# Patient Record
Sex: MALE | Race: WHITE | URBAN - METROPOLITAN AREA
[De-identification: names, ages, dates, MRNs, and addresses within clinical notes are randomized per-mention and may not be internally consistent; named-entity substitution may affect disease eponyms.]

---

## 2021-01-05 ENCOUNTER — OFFICE VISIT (OUTPATIENT)
Dept: FAMILY MEDICINE CLINIC | Facility: CLINIC | Age: 41
End: 2021-01-05
Payer: COMMERCIAL

## 2021-01-05 VITALS
OXYGEN SATURATION: 98 % | SYSTOLIC BLOOD PRESSURE: 116 MMHG | DIASTOLIC BLOOD PRESSURE: 69 MMHG | TEMPERATURE: 97 F | RESPIRATION RATE: 18 BRPM | HEART RATE: 60 BPM

## 2021-01-05 DIAGNOSIS — Z20.822 EXPOSURE TO COVID-19 VIRUS: ICD-10-CM

## 2021-01-05 DIAGNOSIS — R05.9 COUGH: Primary | ICD-10-CM

## 2021-01-05 PROCEDURE — 3078F DIAST BP <80 MM HG: CPT | Performed by: NURSE PRACTITIONER

## 2021-01-05 PROCEDURE — 3074F SYST BP LT 130 MM HG: CPT | Performed by: NURSE PRACTITIONER

## 2021-01-05 PROCEDURE — 99202 OFFICE O/P NEW SF 15 MIN: CPT | Performed by: NURSE PRACTITIONER

## 2021-01-05 NOTE — PATIENT INSTRUCTIONS
• Covid hotline number is [phone]  • Results for covid testing can vary from 3-5 days   • Notify your employer or school of testing  • Remember if you tested negative but were exposed to covid you should quarantine for 14 days even if you did not deve compartió utensilios para comer o beber  * Syd persona estornudó, tosió o de [facility] lo roció con gotas respiratorias    Reducir la duración de la cuarentena puede facilitar que las personas se queden en cuarentena ya que se reduce el tiempo que no p desplazamiento compartido o taxis. 2. Vigile les síntomas con cuidado. Si les síntomas empeoran, llame de inmediato a nicolas proveedor de [facility]. 3. Descanse y permanezca hidratado.    4. Si usted tiene ramsey julia médica, llame al proveedor de atenció Si usted no ha estado expuesto o no tiene conocimiento de Edward Palomo expuesto al COVID-19, y está preocupado acerca de les síntomas, por favor contacte a nicolas proveedor de atención médica para hacerle cualquier pregunta.      Aislamiento en casa    Si [employer] está buscando pacientes que se hayan recuperado de COVID-19 y estén interesados en donar plasma.    El plasma de pacientes convalecientes es un componente de la markos que, en personas que se herrera recuperado de COVID-19, contiene anticuerpos en contra [email or web address]. pdf  [email or web address]  [email or web address]

## 2021-01-05 NOTE — PROGRESS NOTES
CHIEF COMPLAINT:   Patient presents with:  Covid-19 Test      HPI:   Kizzy Gonzalez is a 38year old male who presents with symptoms as described below for 1 days. ? Fever:     Yes []     No [x]       ?  Cough:    Yes [x]     No []       Dry [x]     P /69   Pulse 60   Temp 97.3 °F (36.3 °C) (Tympanic)   Resp 18   SpO2 98%   GENERAL: appears well, well nourished, in no apparent distress  SKIN: no rashes,no suspicious lesions  HEAD: atraumatic, normocephalic.  no tenderness on palpation of  sinuses Discussed s/s of worsening infection/condition with Patient and importance of prompt medical re-evaluation including when to seek emergency care. Patient voiced understanding    Comfort care as described in Patient Instructions.  Increase fluids and rest. Cualquiera que haya estado en contacto cercano con alguien que tiene COVID-19 debe quedarse en cuarentena en casa yenni 14 días a partir del momento de la exposición y seguir las siguientes recomendaciones.  Si el resultado de nicolas prueba de COVID-19 es pos ? Si tiene síntomas, aislarse inmediatamente y comunicarse con nicolas autoridad local de lesvia pública o con nicolas proveedor de [facility]. ?  Usar un cubrebocas, mantenerse a 2 metros de [facility], lavarse las ousmane, evitar conglomeraciones, y amanda ot 8. Limpie todas las superficies que remedios tocadas con frecuencia, tales gamaliel mostradores/encimeras, partes superiores de las mesas y pomos/perillas de las leda.  Utilice toallitas o pulverizadores de limpieza para el hogar de acuerdo con las instruccione Si tiene fiebre con tos o falta de aire, alexi no ha estado expuesto a alguien con COVID-19 y no salió positivo en la prueba de COVID-19, también deberá permanecer en casa y alejado de otras personas yenni un total de 10 días después de que empezaron les ¿Quién es elegible para donar plasma de paciente convaleciente? Los donadores potenciales de plasma de paciente convaleciente deberán cumplir con lo siguiente:  ? Whit Escoto tenido un diagnóstico confirmado de COVID-19  ?  No guanakito tenido síntomas yenni al me

## 2021-01-07 LAB — SARS-COV-2 RNA,QUAL, RT-PCR: NOT DETECTED
